# Patient Record
Sex: FEMALE | Race: BLACK OR AFRICAN AMERICAN | URBAN - METROPOLITAN AREA
[De-identification: names, ages, dates, MRNs, and addresses within clinical notes are randomized per-mention and may not be internally consistent; named-entity substitution may affect disease eponyms.]

---

## 2022-12-13 ENCOUNTER — EMERGENCY (EMERGENCY)
Facility: HOSPITAL | Age: 27
LOS: 1 days | Discharge: HOME | End: 2022-12-13
Attending: EMERGENCY MEDICINE | Admitting: EMERGENCY MEDICINE

## 2022-12-13 VITALS
DIASTOLIC BLOOD PRESSURE: 72 MMHG | OXYGEN SATURATION: 100 % | SYSTOLIC BLOOD PRESSURE: 112 MMHG | TEMPERATURE: 99 F | HEART RATE: 78 BPM | RESPIRATION RATE: 18 BRPM

## 2022-12-13 PROCEDURE — 99284 EMERGENCY DEPT VISIT MOD MDM: CPT

## 2022-12-13 RX ORDER — IBUPROFEN 200 MG
2 TABLET ORAL
Qty: 42 | Refills: 0
Start: 2022-12-13 | End: 2022-12-19

## 2022-12-13 RX ORDER — IBUPROFEN 200 MG
400 TABLET ORAL ONCE
Refills: 0 | Status: COMPLETED | OUTPATIENT
Start: 2022-12-13 | End: 2022-12-13

## 2022-12-13 RX ADMIN — Medication 400 MILLIGRAM(S): at 16:19

## 2022-12-13 NOTE — ED PROVIDER NOTE - NSFOLLOWUPINSTRUCTIONS_ED_ALL_ED_FT
Adult Dental Clinic  [facility]  [phone] [place] [address], [address] [place] [address]  [phone]  Department of Dental Medicine    Adult Dental Clinic  Hours of Operation  Monday 8:30am – 4:30pm  Tuesday 8:30am – 4:30pm  Wednesday 8:30am – 4:30pm  Thursday 8:30am – 4:30pm  Friday 8am – 4pm

## 2022-12-13 NOTE — PROVIDER CONTACT NOTE (OTHER) - ASSESSMENT
Pt asked for Shelter information. I provided the information, pt said she will go to [facility], she has been there before. Asked for 2 [employer], provided.

## 2022-12-13 NOTE — PROVIDER CONTACT NOTE (OTHER) - RECOMMENDATIONS
Pt had no cloth so provided under shirt and a sweat shirt with howdy. Pt walked out to the bus stop.

## 2022-12-13 NOTE — ED PROVIDER NOTE - OBJECTIVE STATEMENT
28 yo F pmh asthma presents with right upper molar pain and requesting removal of staples from scalp wound.  Patient states she has been having pain in this tooth for 16 years, feels most days.  Last saw dentist ~6 years ago, in [place] where she was living at the time.  She states that she sustained head laceration approximately one month ago which was treated at "[facility] with a W", although she cannot recall name.  She has not documentation from this visit.  She denies headache, fever, chills, discharge, bleeding.  She has no other complaints.  Patient is undomiciled and living in shelter.  She is requesting food and blanket.  Denies etoh or illicit drug use.  Denies si/hi/ah/vh.

## 2022-12-13 NOTE — ED PROVIDER NOTE - CLINICAL SUMMARY MEDICAL DECISION MAKING FREE TEXT BOX
26 yo F pmh asthma presents with right upper molar pain and requesting removal of staples from scalp wound.  VSS AF.  Exam without evident of dental abscess.  Will remove staples from scalp laceration.  Anticipate discharge.

## 2022-12-13 NOTE — ED PROVIDER NOTE - PHYSICAL EXAMINATION
GEN:  Non-toxic appearing, non-distressed, speaking full sentences, non-diaphoretic, AAOx3  HEENT:  neck supple, EOMI, PERRLA, sclera anicteric, no conjunctival pallor or injection, no stridor, normal voice, no tonsillar exudate, uvula midline, no visible dental caries, no gingival swelling or tenderness   CV:  regular rhythm and rate, s1/s2 audible, no murmurs, rubs or gallops, peripheral pulses 2+ and symmetric  PULM:  non-labored respirations, lungs clear to auscultation bilaterally, no wheezes, crackles or rales  ABD:  non distended, non-tender, no rebound, no guarding, negative England's sign, bowel sounds normal, no cvat  MSK:  no gross deformity, non-tender extremities and joints, range of motion grossly normal appearing, no extremity edema, extremities warm and well perfused   NEURO:  AAOx3, CN II-XII intact, motor 5/5 in upper and lower extremities bilaterally, sensation grossly intact in extremities and trunk, finger to nose testing wnl, no nystagmus, negative Romberg, no pronator drift, no gait deficit  SKIN:  left scalp laceration well healed, staples in place

## 2022-12-13 NOTE — ED ADULT TRIAGE NOTE - CHIEF COMPLAINT QUOTE
Pt w/ hx of asthma c/o right lower dental pain radiating to right ear x 9 years, Pt is lacrimis in triage, states "I need food and blankets"

## 2022-12-13 NOTE — ED ADULT NURSE NOTE - OBJECTIVE STATEMENT
c/o pain in the left lower tooth, no swelling to face noted, no redness. denies fever, chills, pt exhibits bizarre behavior, takes off shoes, washes bra in the sink of the ER, denies SI, HI or AVH, calm and cooperative. able to be redirected with verbal instructions. poorly groomed, but well nourished and hydrated. Female

## 2022-12-13 NOTE — ED PROVIDER NOTE - PATIENT PORTAL LINK FT
You can access the FollowMyHealth Patient Portal offered by [facility] by registering at the following website: [email or web address]. By joining [employer]’s FollowMyHealth portal, you will also be able to view your health information using other applications (apps) compatible with our system.